# Patient Record
Sex: MALE | Race: BLACK OR AFRICAN AMERICAN
[De-identification: names, ages, dates, MRNs, and addresses within clinical notes are randomized per-mention and may not be internally consistent; named-entity substitution may affect disease eponyms.]

---

## 2016-09-19 VITALS
SYSTOLIC BLOOD PRESSURE: 149 MMHG | SYSTOLIC BLOOD PRESSURE: 149 MMHG | SYSTOLIC BLOOD PRESSURE: 149 MMHG | DIASTOLIC BLOOD PRESSURE: 81 MMHG | DIASTOLIC BLOOD PRESSURE: 81 MMHG | SYSTOLIC BLOOD PRESSURE: 149 MMHG | DIASTOLIC BLOOD PRESSURE: 81 MMHG | DIASTOLIC BLOOD PRESSURE: 81 MMHG

## 2020-04-27 ENCOUNTER — HOSPITAL ENCOUNTER (OUTPATIENT)
Dept: HOSPITAL 61 - US | Age: 40
Discharge: HOME | End: 2020-04-27
Attending: FAMILY MEDICINE
Payer: COMMERCIAL

## 2020-04-27 DIAGNOSIS — N50.89: ICD-10-CM

## 2020-04-27 DIAGNOSIS — I86.1: ICD-10-CM

## 2020-04-27 DIAGNOSIS — N43.3: Primary | ICD-10-CM

## 2020-04-27 PROCEDURE — 76870 US EXAM SCROTUM: CPT

## 2020-04-27 NOTE — RAD
EXAM: Scrotal Ultrasound

 

INDICATION: Left testicle mass

?

TECHNIQUE: Real-time ultrasound of the scrotum with permanent freeze-frame

documentation.

?

COMPARISON:?None.

?

FINDINGS:

 

RIGHT:

TESTICLE:  4.4 x 2.7 x 2.1 cm. Unremarkable. Normal blood flow.

EPIDIDYMIS: Unremarkable.

OTHER: Small right hydrocele and right varicocele.

 

LEFT:

TESTICLE:  4.3 x 3.1 x 2.0 cm. Unremarkable. Normal blood flow.

EPIDIDYMIS: Unremarkable.

OTHER: Small left varicocele. This is larger than on the right.

 

SCROTAL WALL: Unremarkable.

 

IMPRESSION:

?

Bilateral left greater than right varicoceles. No testicular masses.

Apart from a small right hydrocele as well, the scrotal ultrasound is 

normal.

 

Electronically signed by: Chrisotpher Urena MD (4/27/2020 11:12 AM) 

[id number]

## 2021-03-26 ENCOUNTER — HOSPITAL ENCOUNTER (OUTPATIENT)
Dept: HOSPITAL 61 - LAB | Age: 41
End: 2021-03-26
Attending: INTERNAL MEDICINE
Payer: COMMERCIAL

## 2021-03-26 DIAGNOSIS — R06.02: Primary | ICD-10-CM

## 2021-03-26 LAB
ANION GAP SERPL CALC-SCNC: 7 MMOL/L (ref 6–14)
BASO STIPL BLD QL SMEAR: PRESENT
BASOPHILS # BLD AUTO: 0.1 X10^3/UL (ref 0–0.2)
BASOPHILS NFR BLD: 1 % (ref 0–3)
BUN SERPL-MCNC: 11 MG/DL (ref 8–26)
CALCIUM SERPL-MCNC: 8.5 MG/DL (ref 8.5–10.1)
CHLORIDE SERPL-SCNC: 103 MMOL/L (ref 98–107)
CO2 SERPL-SCNC: 30 MMOL/L (ref 21–32)
CREAT SERPL-MCNC: 1 MG/DL (ref 0.7–1.3)
DACRYOCYTES BLD QL SMEAR: (no result)
EOSINOPHIL NFR BLD: 0.2 X10^3/UL (ref 0–0.7)
EOSINOPHIL NFR BLD: 2 % (ref 0–3)
ERYTHROCYTE [DISTWIDTH] IN BLOOD BY AUTOMATED COUNT: 16.2 % (ref 11.5–14.5)
GFR SERPLBLD BASED ON 1.73 SQ M-ARVRAT: 100.1 ML/MIN
GLUCOSE SERPL-MCNC: 77 MG/DL (ref 70–99)
HCT VFR BLD CALC: 36.1 % (ref 39–53)
HGB BLD-MCNC: 11.6 G/DL (ref 13–17.5)
HYPOCHROMIA BLD QL SMEAR: (no result)
LYMPHOCYTES # BLD: 2.4 X10^3/UL (ref 1–4.8)
LYMPHOCYTES NFR BLD AUTO: 35 % (ref 24–48)
MACROCYTES BLD QL SMEAR: SLIGHT
MCH RBC QN AUTO: 21 PG (ref 25–35)
MCHC RBC AUTO-ENTMCNC: 32 G/DL (ref 31–37)
MCV RBC AUTO: 65 FL (ref 79–100)
MICROCYTES BLD QL SMEAR: (no result)
MONO #: 0.7 X10^3/UL (ref 0–1.1)
MONOCYTES NFR BLD: 10 % (ref 0–9)
NEUT #: 3.5 X10^3/UL (ref 1.8–7.7)
NEUTROPHILS NFR BLD AUTO: 51 % (ref 31–73)
OVALOCYTES BLD QL SMEAR: (no result)
PLATELET # BLD AUTO: 378 X10^3/UL (ref 140–400)
PLATELET # BLD EST: ADEQUATE 10*3/UL
POIKILOCYTOSIS BLD QL SMEAR: SLIGHT
POLYCHROMASIA BLD QL SMEAR: SLIGHT
POTASSIUM SERPL-SCNC: 4.1 MMOL/L (ref 3.5–5.1)
RBC # BLD AUTO: 5.51 X10^6/UL (ref 4.3–5.7)
SODIUM SERPL-SCNC: 140 MMOL/L (ref 136–145)
TARGETS BLD QL SMEAR: (no result)
WBC # BLD AUTO: 6.8 X10^3/UL (ref 4–11)

## 2021-03-26 PROCEDURE — 80048 BASIC METABOLIC PNL TOTAL CA: CPT

## 2021-03-26 PROCEDURE — 85025 COMPLETE CBC W/AUTO DIFF WBC: CPT

## 2021-03-26 PROCEDURE — 36415 COLL VENOUS BLD VENIPUNCTURE: CPT

## 2021-03-26 PROCEDURE — 85379 FIBRIN DEGRADATION QUANT: CPT

## 2021-03-31 ENCOUNTER — HOSPITAL ENCOUNTER (OUTPATIENT)
Dept: HOSPITAL 61 - ECHO | Age: 41
End: 2021-03-31
Attending: INTERNAL MEDICINE
Payer: COMMERCIAL

## 2021-03-31 DIAGNOSIS — I35.1: Primary | ICD-10-CM

## 2021-03-31 PROCEDURE — 93306 TTE W/DOPPLER COMPLETE: CPT

## 2021-03-31 NOTE — CARD
MR#: [id number]

Account#: [id number]

Accession#: [id number]

Date of Study: 03/31/2021

Ordering Physician: NY REZA, 

Referring Physician: [place] REZA, 

Tech: Aylin Aminatarachna, [facility]





--------------- APPROVED REPORT --------------





EXAM: Two-dimensional and M-mode echocardiogram with Doppler and color Doppler.



Other Information 

Quality : GoodHR: 70bpm

Rhythm : NSR



INDICATION

Dyspnea 



RISK FACTORS

Hyperlipidemia



2D DIMENSIONS 

RVDd2.8 (2.9-3.5cm)Left Atrium(2D)2.9 (1.6-4.0cm)

IVSd1.4 (0.7-1.1cm)Aortic Root(2D)3.4 (2.0-3.7cm)

LVDd4.8 (3.9-5.9cm)LVOT Diameter2.0 (1.8-2.4cm)

PWd1.0 (0.7-1.1cm)LVDs3.1 (2.5-4.0cm)

FS (%) 35.8 %SV71.4 ml

LVEF(%)65.3 (>50%)



Aortic Valve

AoV Peak Fei.187.5cm/sAoV VTI35.5cm

AO Peak GR.14.1mmHgLVOT Peak Fei.111.0cm/s

LVOT  VTI 20.32cmAO Mean GR.8mmHg

LILIANA (VMAX)1.77or9VCQ   (VTI)1.76cm2



Mitral Valve

MV E Terusbda02.2cm/sMV DECEL [id number]

MV A Dflbrtex42.3cm/sMV [id number]

E/A  Ratio1.1MVA (PHT)3.30cm2



TDI

E/Lateral E'4.8E/Medial E'9.6



Pulmonary Valve

PV Peak Vkqznhod356.2cm/sPV Peak Grad.6mmHg



Tricuspid Valve

TR P. Szxrbuaw548ta/sRAP [id number]

TR Peak Gr.28osSwYLCO28crHb



Pulmonary Vein

S1 Lsrvduda48.9cm/sD2 Aecyogki83.3cm/s



 LEFT VENTRICLE 

The left ventricle is normal size. There is mild to moderate concentric left ventricular hypertrophy.
 The left ventricular systolic function is normal and the ejection fraction is within normal range. T
he Ejection Fraction is 55%. There is normal LV segmental wall motion. Transmitral Doppler flow patte
rn is Grade II-pseudonormal filling dynamics.



 RIGHT VENTRICLE 

The right ventricle is normal size. There is normal right ventricular wall thickness. The right ventr
icular systolic function is normal.



 ATRIA 

The left atrium size is normal. The right atrium size is normal. The interatrial septum is intact wit
h no evidence for an atrial septal defect or patent foramen ovale as noted on 2-D or Doppler imaging.




 AORTIC VALVE 

The aortic valve is calcified and is functionally bicuspid with fusion of the right and left cusps. D
oppler and Color Flow revealed mild aortic regurgitation. There is no significant aortic valvular crystal
nosis. Calculated aortic valve area is 1.70 cm2 with maximum pressure gradient of 17 mmHg and mean pr
essure gradient of 9 mmHg.



 MITRAL VALVE 

The mitral valve is normal in structure and function. There is no evidence of mitral valve prolapse. 
There is no mitral valve stenosis. Doppler and Color-flow revealed trace mitral regurgitation.



 TRICUSPID VALVE 

The tricuspid valve is normal in structure and function. Doppler and Color Flow revealed trace tricus
pid regurgitation with an estimated PAP of 31 mmHg. There is no tricuspid valve stenosis.



 PULMONIC VALVE 

The pulmonic valve is not well visualized. Doppler and Color Flow revealed trace pulmonic valvular re
gurgitation. There is no pulmonic valvular stenosis.



 GREAT VESSELS 

The aortic root is normal in size. The IVC is normal in size and collapses >50% with inspiration.



 PERICARDIAL EFFUSION 

There is no evidence of significant pericardial effusion.



Critical Notification

Critical Value: No



<Conclusion>

The left ventricular systolic function is normal and the ejection fraction is within normal range. Th
e Ejection Fraction is 55%.

There is normal LV segmental wall motion.

The aortic valve is calcified and is functionally bicuspid with fusion of the right and left cusps.

Doppler and Color Flow revealed mild aortic regurgitation.



Signed by : Alexis Mendoza, 

Electronically Approved : 03/31/2021 11:55:53

## 2021-08-23 ENCOUNTER — HOSPITAL ENCOUNTER (OUTPATIENT)
Dept: HOSPITAL 61 - ECHO | Age: 41
End: 2021-08-23
Attending: INTERNAL MEDICINE
Payer: COMMERCIAL

## 2021-08-23 DIAGNOSIS — R06.00: Primary | ICD-10-CM

## 2021-08-23 DIAGNOSIS — E78.5: ICD-10-CM

## 2021-08-23 PROCEDURE — 93017 CV STRESS TEST TRACING ONLY: CPT

## 2021-08-23 PROCEDURE — 93350 STRESS TTE ONLY: CPT

## 2021-08-23 NOTE — CARD
MR#: [id number]

Account#: [id number]

Accession#: [id number]

Date of Study: 08/23/2021

Ordering Physician: SENAIT SHAH, 

Referring Physician: SENAIT SHAH, 

Tech: Zamzam Calhoun, [facility]





--------------- APPROVED REPORT --------------





INDICATION

Dyspnea 



RISK FACTORS

Hyperlipidemia



Reason : Patient complained of shortness of breath



PROCEDURE

The patient underwent an Exercise Stress Test using the Josias Protocol. Blood pressure, heart rate, a
nd EKG were monitored.

An Echocardiogram was performed by technician in four stages in quad fashion.  At peak stress four se
lected images were obtained and placed side by side with resting images for comparison.



STRESS ECHO FINDINGS

The resting Echocardiogram showed normal left ventricular systolic contractility with an estimated Ej
ection Fraction of about 55 %. 

The Resting Echocardiogram showed normal augmentation of myocardial wall segments using a 16 segment 
model.

The Stress Echocardiogram showed normal augmentation of myocardial wall segments using a 16 segment m
richie.

The Stress Echocardiogram left ventricular systolic contractility has an estimated Ejection Fraction 
of about 70%. 



Test Type:          Exercise

Stress Nurse/Tech: Gayathri Monroe R.N.

Test Indications: dyspnea on exertion

Cardiac History and Allergies: COVID recovered

Medications:     see ehr

Medical History: see ehr

Resting ECG:     SR

Resting Heart Rate: 104 bpm

Resting Blood Pressure: 144/78mmHg

Pretest Chest Pain: No chest pain



Nurse/Tech Notes

lungs cta



Stress Symptoms

No chest pain or symptoms.



POST EXERCISE

Reason for Termination: Reached target heart rate

Target HR: Yes

Max HR: 183 bpm

102% of Maximum Predicted HR: 180 bpm

Exercise duration: 12:36 min:sec, 4 Stage

Exercise capacity: 12.8METs

Max Blood Pressure: 158/59mmHg

Blood Pressure response to exercise: Normal blood pressure response during stress.

Heart Rate response to exercise: normal

Chest Pain: No. 

Arrhythmia: No. 

ST Change: Yes. immediately upon exercise beginning Ts flipped to upright, in Stage 4 ST depression n
oted in V4-5, resolved with recovery



STRESS ECG

Stress EKG shows no significant changes.



Preliminary Notification

Critical Value: No



<Conclusion>

Normal baseline EKG.  No evidence of stress-induced EKG changes.

Good exercise capacity with 12.8 metabolic equivalents achieved on treadmill stress testing

Normal wall motion and ejection fraction at rest.  EF 55%

Normal wall motion and ejection fraction at stress.  EF greater than 70%

Low risk study



Signed by : Alexis Mendoza, 

Electronically Approved : 08/23/2021 15:57:04